# Patient Record
Sex: MALE | Race: WHITE | URBAN - METROPOLITAN AREA
[De-identification: names, ages, dates, MRNs, and addresses within clinical notes are randomized per-mention and may not be internally consistent; named-entity substitution may affect disease eponyms.]

---

## 2017-09-12 ENCOUNTER — HOSPITAL ENCOUNTER (EMERGENCY)
Age: 72
Discharge: STILL ADMITTED OR ADMINISTRATIVE | End: 2017-09-12
Attending: EMERGENCY MEDICINE

## 2017-09-12 PROCEDURE — 80047 BASIC METABLC PNL IONIZED CA: CPT

## 2017-09-12 PROCEDURE — 74011250636 HC RX REV CODE- 250/636: Performed by: EMERGENCY MEDICINE

## 2017-09-12 PROCEDURE — 75810000275 HC EMERGENCY DEPT VISIT NO LEVEL OF CARE

## 2017-09-12 RX ORDER — NALOXONE HYDROCHLORIDE 0.4 MG/ML
INJECTION, SOLUTION INTRAMUSCULAR; INTRAVENOUS; SUBCUTANEOUS
Status: COMPLETED | OUTPATIENT
Start: 2017-09-12 | End: 2017-09-12

## 2017-09-12 NOTE — ED NOTES
Please note, any information or charting on this record is due to an incorrect arrival of this record. All information has been removed to the best of my ability. This patient was NOT seen in [facility]-per this ED Charge RN.    Disregard the Chem8 that has resulted in the patient's record-a correction form has been submitted for removal.

## 2017-09-13 LAB
ANION GAP BLD CALC-SCNC: NORMAL MMOL/L (ref 5–15)
BUN BLD-MCNC: NORMAL MG/DL (ref 9–20)
CA-I BLD-MCNC: NORMAL MMOL/L (ref 1.12–1.32)
CHLORIDE BLD-SCNC: NORMAL MMOL/L (ref 98–107)
CO2 BLD-SCNC: NORMAL MMOL/L (ref 21–32)
CREAT BLD-MCNC: NORMAL MG/DL (ref 0.6–1.3)
GLUCOSE BLD-MCNC: NORMAL MG/DL (ref 65–100)
HCT VFR BLD CALC: NORMAL % (ref 36.6–50.3)
HGB BLD-MCNC: NORMAL GM/DL (ref 12.1–17)
POTASSIUM BLD-SCNC: NORMAL MMOL/L (ref 3.5–5.1)
SERVICE CMNT-IMP: NORMAL
SODIUM BLD-SCNC: NORMAL MMOL/L (ref 136–145)

## 2017-10-07 ENCOUNTER — HOSPITAL ENCOUNTER (EMERGENCY)
Age: 72
Discharge: STILL ADMITTED OR ADMINISTRATIVE | End: 2017-10-07
Attending: EMERGENCY MEDICINE